# Patient Record
Sex: FEMALE | NOT HISPANIC OR LATINO | URBAN - METROPOLITAN AREA
[De-identification: names, ages, dates, MRNs, and addresses within clinical notes are randomized per-mention and may not be internally consistent; named-entity substitution may affect disease eponyms.]

---

## 2020-08-27 ENCOUNTER — TELEPHONE ENCOUNTER (OUTPATIENT)
Dept: URBAN - METROPOLITAN AREA CLINIC 35 | Facility: CLINIC | Age: 60
End: 2020-08-27

## 2020-09-03 ENCOUNTER — LAB OUTSIDE AN ENCOUNTER (OUTPATIENT)
Dept: URBAN - METROPOLITAN AREA CLINIC 37 | Facility: CLINIC | Age: 60
End: 2020-09-03

## 2020-09-03 ENCOUNTER — OFFICE VISIT (OUTPATIENT)
Dept: URBAN - METROPOLITAN AREA CLINIC 37 | Facility: CLINIC | Age: 60
End: 2020-09-03

## 2020-09-03 VITALS — HEIGHT: 66 IN | BODY MASS INDEX: 20.25 KG/M2 | WEIGHT: 126 LBS

## 2020-09-03 PROBLEM — 442076002: Status: ACTIVE | Noted: 2020-09-03

## 2020-09-03 RX ORDER — LEVOTHYROXINE SODIUM 112 UG/1
1 TABLET ON AN EMPTY STOMACH IN THE MORNING TABLET ORAL ONCE A DAY
Status: ACTIVE | COMMUNITY

## 2020-09-03 RX ORDER — PERPHENAZINE 2 MG
TABLET ORAL PRN
Status: ON HOLD | COMMUNITY

## 2020-09-03 RX ORDER — OMEPRAZOLE 20 MG/1
1 CAPSULE CAPSULE, DELAYED RELEASE ORAL
Qty: 30 | Refills: 2 | Status: ON HOLD | COMMUNITY
Start: 2016-11-18

## 2020-09-03 RX ORDER — AMLODIPINE BESYLATE 5 MG/1
1 TABLET TABLET ORAL ONCE A DAY
Status: ON HOLD | COMMUNITY

## 2020-09-03 RX ORDER — MIRTAZAPINE 15 MG/1
1 TABLET AT BEDTIME TABLET, FILM COATED ORAL ONCE A DAY
Qty: 30 | Status: ACTIVE | COMMUNITY

## 2020-09-03 RX ORDER — MECLIZINE HCL 25MG 25 MG/1
1 TABLET AS NEEDED TABLET, CHEWABLE ORAL
Status: ON HOLD | COMMUNITY

## 2020-09-03 RX ORDER — MIRABEGRON 50 MG/1
1 TABLET TABLET, FILM COATED, EXTENDED RELEASE ORAL
Status: ACTIVE | COMMUNITY

## 2020-09-03 RX ORDER — LINACLOTIDE 145 UG/1
1 CAPSULE CAPSULE, GELATIN COATED ORAL ONCE A DAY
Qty: 90 CAPSULE | Refills: 2 | Status: ON HOLD | COMMUNITY
Start: 2016-11-18

## 2020-09-03 RX ORDER — ZOLPIDEM TARTRATE 10 MG/1
1 TABLET AT BEDTIME AS NEEDED TABLET, FILM COATED ORAL
Status: ON HOLD | COMMUNITY

## 2020-09-03 RX ORDER — PRAVASTATIN SODIUM 20 MG/1
1 TABLET TABLET ORAL
Status: ACTIVE | COMMUNITY

## 2020-09-03 RX ORDER — LINACLOTIDE 72 UG/1
1 CAPSULE AT LEAST 30 MINUTES BEFORE THE FIRST MEAL OF THE DAY ON AN EMPTY STOMACH CAPSULE, GELATIN COATED ORAL QPM
Qty: 90 | Refills: 4 | OUTPATIENT
Start: 2020-09-03

## 2020-09-03 RX ORDER — OXYBUTYNIN CHLORIDE 10 MG/1
1 TABLET TABLET, EXTENDED RELEASE ORAL ONCE A DAY
Status: ON HOLD | COMMUNITY

## 2020-09-03 RX ORDER — SODIUM SULFATE, POTASSIUM SULFATE, MAGNESIUM SULFATE 17.5; 3.13; 1.6 G/ML; G/ML; G/ML
AS DIRECTED SOLUTION, CONCENTRATE ORAL ONCE
Qty: 1 KIT | Refills: 0 | OUTPATIENT
Start: 2020-09-03

## 2020-09-03 RX ORDER — LOSARTAN POTASSIUM 50 MG/1
1 TABLET TABLET ORAL ONCE A DAY
Status: ACTIVE | COMMUNITY

## 2020-09-03 RX ORDER — VALSARTAN 40 MG/1
1 TABLET TABLET, FILM COATED ORAL TWICE A DAY
Qty: 60 | Status: ON HOLD | COMMUNITY

## 2020-09-03 RX ORDER — MEGESTROL ACETATE 40 MG/ML
1 ML SUSPENSION ORAL TWICE A DAY
Qty: 60 | Status: ACTIVE | COMMUNITY

## 2020-09-03 NOTE — HPI-MIGRATED HPI
Initial consultation : Patient is here for -> Early satiety & weight loss Onset of symptoms: several months Characteristics: Feeling full after meals, admits eating one meal a day due to feeling full after a few bites of food  Aggravating factors: None Associated symptoms: unintentional weight loss, 14 lbs since June. Occasional nausea when she force herself to eat Has chronic hoarseness d/t laryngeal reflux  Relieving factors: None Medications tried: None. Previously was on Nexium but she stopped Recently started on Megace and Mirtazapine by Dr. Mayfield  Patient also complains of constipation  Patient admits she has tried Linzess 145 mcg but it caused loose stools.  Therefore, patient is taking Miralax PRN Current BM: 1 BM once a week    **Tests/evaluations done previously:  - Last Colonoscopy done 12/22/2016, with me at [facility]. at that time five polyp and a larger polyp (measuring 10 mm) were detected and resected. Histology showed they were hyperplastic polyps (4) and tubular adenomas (2). Good quality bowel prep. Advised to repeat in 5 years  - EGD on the same day: the esophagus appeared normal with bxx of the lower third of the esophagus showing basilar hyperplasia and elongation of the vascular papillae suggestive of reflux esophagitis, negative for dysplasia. The GE junction was irregular. One 6mm gastric polyp was detected and resected from the gastric antrum bxx showed it contained regenerative changes with polypoid foveolar hyperplasia. Gastritis was found in the gastric body and antrum with bxx showing mild chronic gastritis consistent with reactive gastropathy. No H. pylori or IM identified. Normal duodenum;

## 2020-09-03 NOTE — EXAM-MIGRATED EXAMINATIONS
GENERAL APPEARANCE: - alert, in no acute distress, well developed, well nourished;   HEAD: - atraumatic, normocephalic;   EYES: - sclera anicteric bilaterally;   NECK/THYROID: - neck supple, full range of motion, no cervical lymphadenopathy, no thyroid nodules, no thyromegaly, trachea midline;   NEUROLOGIC: - cranial nerves 2-12 grossly intact;   PSYCH: - cooperative with exam, mood/affect full range;

## 2020-09-16 ENCOUNTER — TELEPHONE ENCOUNTER (OUTPATIENT)
Dept: URBAN - METROPOLITAN AREA CLINIC 35 | Facility: CLINIC | Age: 60
End: 2020-09-16

## 2020-09-24 ENCOUNTER — OFFICE VISIT (OUTPATIENT)
Dept: URBAN - METROPOLITAN AREA CLINIC 35 | Facility: CLINIC | Age: 60
End: 2020-09-24

## 2020-09-30 ENCOUNTER — OFFICE VISIT (OUTPATIENT)
Dept: URBAN - METROPOLITAN AREA SURGERY CENTER 8 | Facility: SURGERY CENTER | Age: 60
End: 2020-09-30

## 2020-10-15 ENCOUNTER — OFFICE VISIT (OUTPATIENT)
Dept: URBAN - METROPOLITAN AREA CLINIC 37 | Facility: CLINIC | Age: 60
End: 2020-10-15

## 2020-10-21 ENCOUNTER — TELEPHONE ENCOUNTER (OUTPATIENT)
Dept: URBAN - METROPOLITAN AREA CLINIC 35 | Facility: CLINIC | Age: 60
End: 2020-10-21

## 2020-10-26 ENCOUNTER — OFFICE VISIT (OUTPATIENT)
Dept: URBAN - METROPOLITAN AREA MEDICAL CENTER 10 | Facility: MEDICAL CENTER | Age: 60
End: 2020-10-26

## 2020-12-07 ENCOUNTER — TELEPHONE ENCOUNTER (OUTPATIENT)
Dept: URBAN - METROPOLITAN AREA CLINIC 35 | Facility: CLINIC | Age: 60
End: 2020-12-07

## 2020-12-07 ENCOUNTER — OFFICE VISIT (OUTPATIENT)
Dept: URBAN - METROPOLITAN AREA MEDICAL CENTER 10 | Facility: MEDICAL CENTER | Age: 60
End: 2020-12-07

## 2020-12-07 RX ORDER — ZOLPIDEM TARTRATE 10 MG/1
1 TABLET AT BEDTIME AS NEEDED TABLET, FILM COATED ORAL
Status: ON HOLD | COMMUNITY

## 2020-12-07 RX ORDER — LINACLOTIDE 145 UG/1
1 CAPSULE CAPSULE, GELATIN COATED ORAL ONCE A DAY
Qty: 90 CAPSULE | Refills: 2 | Status: ON HOLD | COMMUNITY
Start: 2016-11-18

## 2020-12-07 RX ORDER — LINACLOTIDE 72 UG/1
1 CAPSULE AT LEAST 30 MINUTES BEFORE THE FIRST MEAL OF THE DAY ON AN EMPTY STOMACH CAPSULE, GELATIN COATED ORAL QPM
Qty: 90 | Refills: 4 | Status: ACTIVE | COMMUNITY
Start: 2020-09-03

## 2020-12-07 RX ORDER — VALSARTAN 40 MG/1
1 TABLET TABLET, FILM COATED ORAL TWICE A DAY
Qty: 60 | Status: ON HOLD | COMMUNITY

## 2020-12-07 RX ORDER — MIRABEGRON 50 MG/1
1 TABLET TABLET, FILM COATED, EXTENDED RELEASE ORAL
Status: ACTIVE | COMMUNITY

## 2020-12-07 RX ORDER — OMEPRAZOLE 20 MG/1
1 CAPSULE CAPSULE, DELAYED RELEASE ORAL
Qty: 30 | Refills: 2 | Status: ON HOLD | COMMUNITY
Start: 2016-11-18

## 2020-12-07 RX ORDER — MEGESTROL ACETATE 40 MG/ML
1 ML SUSPENSION ORAL TWICE A DAY
Qty: 60 | Status: ACTIVE | COMMUNITY

## 2020-12-07 RX ORDER — SODIUM SULFATE, POTASSIUM SULFATE, MAGNESIUM SULFATE 17.5; 3.13; 1.6 G/ML; G/ML; G/ML
AS DIRECTED SOLUTION, CONCENTRATE ORAL ONCE
Qty: 1 KIT | Refills: 0 | Status: ACTIVE | COMMUNITY
Start: 2020-09-03

## 2020-12-07 RX ORDER — LOSARTAN POTASSIUM 50 MG/1
1 TABLET TABLET ORAL ONCE A DAY
Status: ACTIVE | COMMUNITY

## 2020-12-07 RX ORDER — MECLIZINE HCL 25MG 25 MG/1
1 TABLET AS NEEDED TABLET, CHEWABLE ORAL
Status: ON HOLD | COMMUNITY

## 2020-12-07 RX ORDER — AMLODIPINE BESYLATE 5 MG/1
1 TABLET TABLET ORAL ONCE A DAY
Status: ON HOLD | COMMUNITY

## 2020-12-07 RX ORDER — LEVOTHYROXINE SODIUM 112 UG/1
1 TABLET ON AN EMPTY STOMACH IN THE MORNING TABLET ORAL ONCE A DAY
Status: ACTIVE | COMMUNITY

## 2020-12-07 RX ORDER — PERPHENAZINE 2 MG
TABLET ORAL PRN
Status: ON HOLD | COMMUNITY

## 2020-12-07 RX ORDER — OXYBUTYNIN CHLORIDE 10 MG/1
1 TABLET TABLET, EXTENDED RELEASE ORAL ONCE A DAY
Status: ON HOLD | COMMUNITY

## 2020-12-07 RX ORDER — MIRTAZAPINE 15 MG/1
1 TABLET AT BEDTIME TABLET, FILM COATED ORAL ONCE A DAY
Qty: 30 | Status: ACTIVE | COMMUNITY

## 2020-12-07 RX ORDER — PRAVASTATIN SODIUM 20 MG/1
1 TABLET TABLET ORAL
Status: ACTIVE | COMMUNITY

## 2020-12-08 ENCOUNTER — TELEPHONE ENCOUNTER (OUTPATIENT)
Dept: URBAN - METROPOLITAN AREA CLINIC 35 | Facility: CLINIC | Age: 60
End: 2020-12-08

## 2020-12-22 ENCOUNTER — OFFICE VISIT (OUTPATIENT)
Dept: URBAN - METROPOLITAN AREA CLINIC 35 | Facility: CLINIC | Age: 60
End: 2020-12-22

## 2020-12-22 VITALS — HEIGHT: 66 IN | BODY MASS INDEX: 20.25 KG/M2 | WEIGHT: 126 LBS

## 2020-12-22 RX ORDER — LOSARTAN POTASSIUM 50 MG/1
1 TABLET TABLET ORAL ONCE A DAY
Status: ACTIVE | COMMUNITY

## 2020-12-22 RX ORDER — OXYBUTYNIN CHLORIDE 10 MG/1
1 TABLET TABLET, EXTENDED RELEASE ORAL ONCE A DAY
Status: ON HOLD | COMMUNITY

## 2020-12-22 RX ORDER — PERPHENAZINE 2 MG
TABLET ORAL PRN
Status: ON HOLD | COMMUNITY

## 2020-12-22 RX ORDER — MULTIVITAMIN
1 TABLET TABLET ORAL ONCE A DAY
Status: ACTIVE | COMMUNITY

## 2020-12-22 RX ORDER — LINACLOTIDE 145 UG/1
1 CAPSULE CAPSULE, GELATIN COATED ORAL ONCE A DAY
Qty: 90 CAPSULE | Refills: 2 | Status: ON HOLD | COMMUNITY
Start: 2016-11-18

## 2020-12-22 RX ORDER — OMEPRAZOLE 20 MG/1
1 CAPSULE CAPSULE, DELAYED RELEASE ORAL
Qty: 30 | Refills: 2 | Status: ON HOLD | COMMUNITY
Start: 2016-11-18

## 2020-12-22 RX ORDER — MEGESTROL ACETATE 40 MG/ML
1 ML SUSPENSION ORAL TWICE A DAY
Qty: 60 | Status: ACTIVE | COMMUNITY

## 2020-12-22 RX ORDER — AMLODIPINE BESYLATE 5 MG/1
1 TABLET TABLET ORAL ONCE A DAY
Status: ON HOLD | COMMUNITY

## 2020-12-22 RX ORDER — PRAVASTATIN SODIUM 20 MG/1
1 TABLET TABLET ORAL
Status: ACTIVE | COMMUNITY

## 2020-12-22 RX ORDER — LEVOTHYROXINE SODIUM 112 UG/1
1 TABLET ON AN EMPTY STOMACH IN THE MORNING TABLET ORAL ONCE A DAY
Status: ACTIVE | COMMUNITY

## 2020-12-22 RX ORDER — ZOLPIDEM TARTRATE 10 MG/1
1 TABLET AT BEDTIME AS NEEDED TABLET, FILM COATED ORAL
Status: ON HOLD | COMMUNITY

## 2020-12-22 RX ORDER — MECLIZINE HCL 25MG 25 MG/1
1 TABLET AS NEEDED TABLET, CHEWABLE ORAL
Status: ON HOLD | COMMUNITY

## 2020-12-22 RX ORDER — LINACLOTIDE 72 UG/1
1 CAPSULE AT LEAST 30 MINUTES BEFORE THE FIRST MEAL OF THE DAY ON AN EMPTY STOMACH CAPSULE, GELATIN COATED ORAL QPM
Qty: 90 | Refills: 4 | Status: ACTIVE | COMMUNITY
Start: 2020-09-03

## 2020-12-22 RX ORDER — VALSARTAN 40 MG/1
1 TABLET TABLET, FILM COATED ORAL TWICE A DAY
Qty: 60 | Status: ON HOLD | COMMUNITY

## 2020-12-22 RX ORDER — MIRABEGRON 50 MG/1
1 TABLET TABLET, FILM COATED, EXTENDED RELEASE ORAL
Status: ACTIVE | COMMUNITY

## 2020-12-22 RX ORDER — OMEPRAZOLE 20 MG/1
1 CAPSULE 30 MINUTES BEFORE MORNING MEAL CAPSULE, DELAYED RELEASE ORAL ONCE A DAY
Qty: 30 | Refills: 2 | OUTPATIENT
Start: 2020-12-22

## 2020-12-22 RX ORDER — MIRTAZAPINE 15 MG/1
1 TABLET AT BEDTIME TABLET, FILM COATED ORAL ONCE A DAY
Qty: 30 | Status: ON HOLD | COMMUNITY

## 2020-12-22 RX ORDER — LINACLOTIDE 72 UG/1
1 CAPSULE AT LEAST 30 MINUTES BEFORE THE FIRST MEAL OF THE DAY ON AN EMPTY STOMACH CAPSULE, GELATIN COATED ORAL QPM
Qty: 90 | Refills: 4

## 2020-12-22 NOTE — HPI-MIGRATED HPI
Follow up OV : Last OV was -> 3 months ago Patient was previously on Linzess 145 mcg but discontinued due to loose stool ;   Follow up OV : Patient is on -> Linzess 72 mcg PRN only, not daily as prescriber Current BM: 2 hard BM a week, occasional rectal bleeding upon wiping;   Follow up OV : Patient is here for a routine OV for -> Constipation;   Post-op OV : Patient -> denies any new changes in his/her health status since last OV;   Post-op OV : Patient reports of current symptoms -> Patient denies any more weight loss, has actually gained 5 lbs since last OV.  Continues with early satiety  **** Patient was originally scheduled for a double procedure due to weight loss. However, patient was hesitant to have colonoscopy procedure. Therefore only EGD was preformed;   Post-op OV : After EGD on -> 12/07/2020, done due to early satiety and weight loss. The esophagus was normal with distal esophageal bxx showing increased intraepithelial chronic inflammation. The GE junction was irregular. Gastritis was found with bxx confirming mild chronic gastritis in the gastric body and antrum. No evidence of H. pylori or IM. Normal duodenum with bxx confirming no significant histopathologic change;   Post-op OV : Patient denies -> rectal bleeding, fever, nausea & vomiting since the procedure date;

## 2021-02-23 ENCOUNTER — OFFICE VISIT (OUTPATIENT)
Dept: URBAN - METROPOLITAN AREA CLINIC 35 | Facility: CLINIC | Age: 61
End: 2021-02-23

## 2021-02-23 VITALS — WEIGHT: 121 LBS | HEIGHT: 66 IN | BODY MASS INDEX: 19.44 KG/M2

## 2021-02-23 RX ORDER — LINACLOTIDE 72 UG/1
1 CAPSULE AT LEAST 30 MINUTES BEFORE THE FIRST MEAL OF THE DAY ON AN EMPTY STOMACH CAPSULE, GELATIN COATED ORAL QPM
Qty: 90 | Refills: 4 | Status: ACTIVE | COMMUNITY

## 2021-02-23 RX ORDER — LINACLOTIDE 145 UG/1
1 CAPSULE AT LEAST 30 MINUTES BEFORE THE FIRST MEAL OF THE DAY ON AN EMPTY STOMACH CAPSULE, GELATIN COATED ORAL QPM
Qty: 90 | Refills: 4

## 2021-02-23 RX ORDER — VALSARTAN 160 MG/1
1 TABLET TABLET, FILM COATED ORAL ONCE A DAY
Status: ACTIVE | COMMUNITY

## 2021-02-23 RX ORDER — AMLODIPINE BESYLATE 5 MG/1
1 TABLET TABLET ORAL ONCE A DAY
Status: ON HOLD | COMMUNITY

## 2021-02-23 RX ORDER — MULTIVITAMIN
1 TABLET TABLET ORAL ONCE A DAY
Status: ACTIVE | COMMUNITY

## 2021-02-23 RX ORDER — ZOLPIDEM TARTRATE 10 MG/1
1 TABLET AT BEDTIME AS NEEDED TABLET, FILM COATED ORAL
Status: ON HOLD | COMMUNITY

## 2021-02-23 RX ORDER — PERPHENAZINE 2 MG
TABLET ORAL PRN
Status: ON HOLD | COMMUNITY

## 2021-02-23 RX ORDER — OXYBUTYNIN CHLORIDE 10 MG/1
1 TABLET TABLET, EXTENDED RELEASE ORAL ONCE A DAY
Status: ON HOLD | COMMUNITY

## 2021-02-23 RX ORDER — LEVOTHYROXINE SODIUM 112 UG/1
1 TABLET ON AN EMPTY STOMACH IN THE MORNING TABLET ORAL ONCE A DAY
Status: ACTIVE | COMMUNITY

## 2021-02-23 RX ORDER — AMLODIPINE BESYLATE 5 MG/1
1 TABLET TABLET ORAL ONCE A DAY
Status: ACTIVE | COMMUNITY

## 2021-02-23 RX ORDER — MEGESTROL ACETATE 40 MG/ML
1 ML SUSPENSION ORAL TWICE A DAY
Qty: 60 | Status: ON HOLD | COMMUNITY

## 2021-02-23 RX ORDER — PRAVASTATIN SODIUM 20 MG/1
1 TABLET TABLET ORAL
Status: ACTIVE | COMMUNITY

## 2021-02-23 RX ORDER — MIRTAZAPINE 15 MG/1
1 TABLET AT BEDTIME TABLET, FILM COATED ORAL ONCE A DAY
Qty: 30 | Status: ON HOLD | COMMUNITY

## 2021-02-23 RX ORDER — OMEPRAZOLE 20 MG/1
1 CAPSULE 30 MINUTES BEFORE MORNING MEAL CAPSULE, DELAYED RELEASE ORAL ONCE A DAY
Qty: 30 | Refills: 2 | Status: ACTIVE | COMMUNITY
Start: 2020-12-22

## 2021-02-23 RX ORDER — CHOLECALCIFEROL (VITAMIN D3) 125 MCG
1 TABLET CAPSULE ORAL
Status: ACTIVE | COMMUNITY

## 2021-02-23 RX ORDER — OMEPRAZOLE 20 MG/1
1 CAPSULE 30 MINUTES BEFORE MORNING MEAL CAPSULE, DELAYED RELEASE ORAL ONCE A DAY
Qty: 30 | Refills: 3

## 2021-02-23 RX ORDER — MIRABEGRON 50 MG/1
1 TABLET TABLET, FILM COATED, EXTENDED RELEASE ORAL
Status: ACTIVE | COMMUNITY

## 2021-02-23 RX ORDER — MECLIZINE HCL 25MG 25 MG/1
1 TABLET AS NEEDED TABLET, CHEWABLE ORAL
Status: ON HOLD | COMMUNITY

## 2021-02-23 NOTE — HPI-MIGRATED HPI
Follow up OV : Patient is here for a routine OV for -> Gastritis and constipation;   Follow up OV : Last OV was -> 2 months ago  Patient was originally scheduled for a double procedure due to weight loss. However, patient was hesitant to have colonoscopy procedure. Therefore only EGD was preformed Patient denies any significant weight loss since then  Gastritis:  EGD on 12/07/2020 revealed mild chronic gastritis, negative for IM and H. pylori Patient is on Omeprazole 20mg PRN only  Current Sx: denies UGI symptoms  Constipation:  Patient continues taking Linzess 72 mcg QPM since last OV Patient was previously on Linzess 145 mcg but discontinued due to loose stool.  Ooccasional rectal bleeding on TP Current BM: 2-3 BM a week, admits occasional rectal bleeding upon wiping when she has hard stools ;

## 2021-04-27 ENCOUNTER — OFFICE VISIT (OUTPATIENT)
Dept: URBAN - METROPOLITAN AREA CLINIC 35 | Facility: CLINIC | Age: 61
End: 2021-04-27

## 2021-04-27 VITALS — HEIGHT: 66 IN | WEIGHT: 125 LBS | BODY MASS INDEX: 20.09 KG/M2

## 2021-04-27 RX ORDER — PRAVASTATIN SODIUM 20 MG/1
1 TABLET TABLET ORAL
Status: ACTIVE | COMMUNITY

## 2021-04-27 RX ORDER — OXYBUTYNIN CHLORIDE 10 MG/1
1 TABLET TABLET, EXTENDED RELEASE ORAL ONCE A DAY
Status: ON HOLD | COMMUNITY

## 2021-04-27 RX ORDER — LEVOTHYROXINE SODIUM 112 UG/1
1 TABLET ON AN EMPTY STOMACH IN THE MORNING TABLET ORAL ONCE A DAY
Status: ACTIVE | COMMUNITY

## 2021-04-27 RX ORDER — OMEPRAZOLE 20 MG/1
1 CAPSULE 30 MINUTES BEFORE MORNING MEAL CAPSULE, DELAYED RELEASE ORAL ONCE A DAY
Qty: 30 | Refills: 3

## 2021-04-27 RX ORDER — LINACLOTIDE 290 UG/1
1 CAPSULE AT LEAST 30 MINUTES BEFORE THE FIRST MEAL OF THE DAY ON AN EMPTY STOMACH CAPSULE, GELATIN COATED ORAL QPM
Qty: 90 | Refills: 4

## 2021-04-27 RX ORDER — AMLODIPINE BESYLATE 5 MG/1
1 TABLET TABLET ORAL ONCE A DAY
Status: ON HOLD | COMMUNITY

## 2021-04-27 RX ORDER — MIRTAZAPINE 15 MG/1
1 TABLET AT BEDTIME TABLET, FILM COATED ORAL ONCE A DAY
Qty: 30 | Status: ON HOLD | COMMUNITY

## 2021-04-27 RX ORDER — MIRABEGRON 50 MG/1
1 TABLET TABLET, FILM COATED, EXTENDED RELEASE ORAL
Status: ACTIVE | COMMUNITY

## 2021-04-27 RX ORDER — MECLIZINE HCL 25MG 25 MG/1
1 TABLET AS NEEDED TABLET, CHEWABLE ORAL
Status: ON HOLD | COMMUNITY

## 2021-04-27 RX ORDER — OMEPRAZOLE 20 MG/1
1 CAPSULE 30 MINUTES BEFORE MORNING MEAL CAPSULE, DELAYED RELEASE ORAL ONCE A DAY
Qty: 30 | Refills: 3 | Status: ACTIVE | COMMUNITY

## 2021-04-27 RX ORDER — VALSARTAN 160 MG/1
1 TABLET TABLET, FILM COATED ORAL ONCE A DAY
Status: ACTIVE | COMMUNITY

## 2021-04-27 RX ORDER — MULTIVITAMIN
1 TABLET TABLET ORAL ONCE A DAY
Status: ACTIVE | COMMUNITY

## 2021-04-27 RX ORDER — AMLODIPINE BESYLATE 5 MG/1
1 TABLET TABLET ORAL ONCE A DAY
Status: ACTIVE | COMMUNITY

## 2021-04-27 RX ORDER — CHOLECALCIFEROL (VITAMIN D3) 125 MCG
1 TABLET CAPSULE ORAL
Status: ACTIVE | COMMUNITY

## 2021-04-27 RX ORDER — PERPHENAZINE 2 MG
TABLET ORAL PRN
Status: ON HOLD | COMMUNITY

## 2021-04-27 RX ORDER — MEGESTROL ACETATE 40 MG/ML
1 ML SUSPENSION ORAL TWICE A DAY
Qty: 60 | Status: ON HOLD | COMMUNITY

## 2021-04-27 RX ORDER — ZOLPIDEM TARTRATE 10 MG/1
1 TABLET AT BEDTIME AS NEEDED TABLET, FILM COATED ORAL
Status: ON HOLD | COMMUNITY

## 2021-04-27 RX ORDER — LINACLOTIDE 145 UG/1
1 CAPSULE AT LEAST 30 MINUTES BEFORE THE FIRST MEAL OF THE DAY ON AN EMPTY STOMACH CAPSULE, GELATIN COATED ORAL QPM
Qty: 90 | Refills: 4 | Status: ACTIVE | COMMUNITY

## 2021-04-27 NOTE — HPI-MIGRATED HPI
Follow up OV : Last OV was -> 2 months ago  Patient was originally scheduled for a double procedure in 10/2020 due to weight loss. However, patient was hesitant to have colonoscopy procedure. Therefore only EGD was performed Patient denies any significant weight loss since then Patient no longer taking Megace and Mirtazapine per Dr. Mayfield  Gastritis:  EGD on 12/07/2020 revealed mild chronic gastritis, negative for IM and H. pylori Patient is on Omeprazole 20mg PRN only  Current Sx: Denies UGI symptoms  Constipation:  Patient has been on Linzess 145 mcg QPM + OTC Senna or Ducolax PRN  Current BM: 1 BM daily ;   Follow up OV : Patient is here for a routine OV for -> Gastritis and constipation;

## 2021-08-05 ENCOUNTER — TELEPHONE ENCOUNTER (OUTPATIENT)
Dept: URBAN - METROPOLITAN AREA CLINIC 35 | Facility: CLINIC | Age: 61
End: 2021-08-05

## 2021-09-14 ENCOUNTER — OFFICE VISIT (OUTPATIENT)
Dept: URBAN - METROPOLITAN AREA CLINIC 35 | Facility: CLINIC | Age: 61
End: 2021-09-14

## 2021-09-14 ENCOUNTER — LAB OUTSIDE AN ENCOUNTER (OUTPATIENT)
Dept: URBAN - METROPOLITAN AREA CLINIC 35 | Facility: CLINIC | Age: 61
End: 2021-09-14

## 2021-09-14 VITALS — HEIGHT: 66 IN

## 2021-09-14 PROBLEM — 89362005: Status: ACTIVE | Noted: 2020-09-03

## 2021-09-14 RX ORDER — PRAVASTATIN SODIUM 20 MG/1
1 TABLET TABLET ORAL
Status: ACTIVE | COMMUNITY

## 2021-09-14 RX ORDER — AMLODIPINE BESYLATE 5 MG/1
1 TABLET TABLET ORAL ONCE A DAY
Status: ACTIVE | COMMUNITY

## 2021-09-14 RX ORDER — OXYBUTYNIN CHLORIDE 10 MG/1
1 TABLET TABLET, EXTENDED RELEASE ORAL ONCE A DAY
Status: ON HOLD | COMMUNITY

## 2021-09-14 RX ORDER — AMLODIPINE BESYLATE 5 MG/1
1 TABLET TABLET ORAL ONCE A DAY
Status: ON HOLD | COMMUNITY

## 2021-09-14 RX ORDER — LINACLOTIDE 290 UG/1
1 CAPSULE AT LEAST 30 MINUTES BEFORE THE FIRST MEAL OF THE DAY ON AN EMPTY STOMACH CAPSULE, GELATIN COATED ORAL QPM
Qty: 90 | Refills: 4 | Status: ACTIVE | COMMUNITY

## 2021-09-14 RX ORDER — LEVOTHYROXINE SODIUM 112 UG/1
1 TABLET ON AN EMPTY STOMACH IN THE MORNING TABLET ORAL ONCE A DAY
Status: ACTIVE | COMMUNITY

## 2021-09-14 RX ORDER — MEGESTROL ACETATE 40 MG/ML
1 ML SUSPENSION ORAL TWICE A DAY
Qty: 60 | Status: ON HOLD | COMMUNITY

## 2021-09-14 RX ORDER — OMEPRAZOLE 20 MG/1
1 CAPSULE 30 MINUTES BEFORE MORNING MEAL CAPSULE, DELAYED RELEASE ORAL ONCE A DAY
Qty: 30 | Refills: 3

## 2021-09-14 RX ORDER — OMEPRAZOLE 20 MG/1
1 CAPSULE 30 MINUTES BEFORE MORNING MEAL CAPSULE, DELAYED RELEASE ORAL ONCE A DAY
Qty: 30 | Refills: 3 | Status: ACTIVE | COMMUNITY

## 2021-09-14 RX ORDER — ZOLPIDEM TARTRATE 10 MG/1
1 TABLET AT BEDTIME AS NEEDED TABLET, FILM COATED ORAL
Status: ON HOLD | COMMUNITY

## 2021-09-14 RX ORDER — PERPHENAZINE 2 MG
TABLET ORAL PRN
Status: ON HOLD | COMMUNITY

## 2021-09-14 RX ORDER — MULTIVITAMIN
1 TABLET TABLET ORAL ONCE A DAY
Status: ACTIVE | COMMUNITY

## 2021-09-14 RX ORDER — VALSARTAN 160 MG/1
1 TABLET TABLET, FILM COATED ORAL ONCE A DAY
Status: ACTIVE | COMMUNITY

## 2021-09-14 RX ORDER — LINACLOTIDE 290 UG/1
1 CAPSULE AT LEAST 30 MINUTES BEFORE THE FIRST MEAL OF THE DAY ON AN EMPTY STOMACH CAPSULE, GELATIN COATED ORAL QPM
Qty: 90 | Refills: 4

## 2021-09-14 RX ORDER — SODIUM, POTASSIUM,MAG SULFATES 17.5-3.13G
177ML SOLUTION, RECONSTITUTED, ORAL ORAL ONCE
Qty: 1 KIT | Refills: 0 | OUTPATIENT
Start: 2021-09-14

## 2021-09-14 RX ORDER — MIRABEGRON 50 MG/1
1 TABLET TABLET, FILM COATED, EXTENDED RELEASE ORAL
Status: ACTIVE | COMMUNITY

## 2021-09-14 RX ORDER — MECLIZINE HCL 25MG 25 MG/1
1 TABLET AS NEEDED TABLET, CHEWABLE ORAL
Status: ON HOLD | COMMUNITY

## 2021-09-14 RX ORDER — CHOLECALCIFEROL (VITAMIN D3) 125 MCG
1 TABLET CAPSULE ORAL
Status: ACTIVE | COMMUNITY

## 2021-09-14 RX ORDER — MIRTAZAPINE 15 MG/1
1 TABLET AT BEDTIME TABLET, FILM COATED ORAL ONCE A DAY
Qty: 30 | Status: ON HOLD | COMMUNITY

## 2021-09-14 NOTE — HPI-MIGRATED HPI
Follow up OV : Patient is here for a routine OV for -> Weight loss and Gastritis;   Follow up OV : Current symptoms are -> none;   Follow up OV : Last OV was -> 4 months ago Patient was seen 09/2020 due to complaints of unintentional weight loss.  Subsequently had an EGD done for etiology on 12/2020, The esophagus was normal with distal esophageal bxx showing increased intraepithelial chronic inflammation. The GE junction was irregular. Gastritis was found with bxx confirming mild chronic gastritis in the gastric body and antrum. No evidence of H. pylori or IM. Normal duodenum with bxx confirming no significant histopathologic change.  Patient was originally scheduled for a double procedure due to weight loss. However, patient was hesitant to have colonoscopy procedure. Therefore only EGD was preformed.  Patient also continues taking Megace and Mirtazapine per Dr. Mayfield . Patient also taking Omeprazole 40 mg PRN only for gastritis;   Colorectal cancer screening : Family history of GI malignancy: -> Denies;   Colorectal cancer screening : Patient is here for -> high risk surveillance colonoscopy due to personal history of colonic polyps;   Colorectal cancer screening : Last colonoscopy was -> 1 year ago but with poor bowel prep;   Colorectal cancer screening : Patients denies -> rectal bleeding, change in bowel habits, diarrhea, or abdominal pain; + constipation;   Colorectal cancer screening : Patient admits -> history of chronic constipation.  Patient continues taking Linzess 290 mcg QPM and MiraLax PRN QAM;   Colorectal cancer screening : Current bowel movement patterns -> QOD-Q3days;     Colorectal cancer screening : Denies dysphagia or odynophagia Currently taking anticoagulants/NSAIDs: None;

## 2021-09-14 NOTE — EXAM-MIGRATED EXAMINATIONS
GENERAL APPEARANCE: - alert, in no acute distress, well developed, well nourished;   HEAD: - normocephalic, atraumatic;   EYES: - sclera anicteric bilaterally;   NECK/THYROID: - neck supple, full range of motion, no cervical lymphadenopathy, no jugular venous distention, no thyroid nodules, no thyromegaly, trachea midline;   NEUROLOGIC: - cranial nerves 2-12 grossly intact;   PSYCH: - good eye contact, mood/affect full range;

## 2021-12-13 ENCOUNTER — OFFICE VISIT (OUTPATIENT)
Dept: URBAN - METROPOLITAN AREA MEDICAL CENTER 10 | Facility: MEDICAL CENTER | Age: 61
End: 2021-12-13

## 2022-01-04 ENCOUNTER — OFFICE VISIT (OUTPATIENT)
Dept: URBAN - METROPOLITAN AREA CLINIC 35 | Facility: CLINIC | Age: 62
End: 2022-01-04

## 2022-01-12 ENCOUNTER — TELEPHONE ENCOUNTER (OUTPATIENT)
Dept: URBAN - METROPOLITAN AREA CLINIC 36 | Facility: CLINIC | Age: 62
End: 2022-01-12

## 2022-01-31 ENCOUNTER — OFFICE VISIT (OUTPATIENT)
Dept: URBAN - METROPOLITAN AREA MEDICAL CENTER 10 | Facility: MEDICAL CENTER | Age: 62
End: 2022-01-31
Payer: COMMERCIAL

## 2022-01-31 DIAGNOSIS — Z86.010 ADENOMAS PERSONAL HISTORY OF COLONIC POLYPS: ICD-10-CM

## 2022-01-31 DIAGNOSIS — D12.2 ADENOMA OF ASCENDING COLON: ICD-10-CM

## 2022-01-31 DIAGNOSIS — K62.89 ACUTE PROCTITIS: ICD-10-CM

## 2022-01-31 PROCEDURE — 45380 COLONOSCOPY AND BIOPSY: CPT | Performed by: INTERNAL MEDICINE

## 2022-02-04 ENCOUNTER — DASHBOARD ENCOUNTERS (OUTPATIENT)
Age: 62
End: 2022-02-04

## 2022-02-16 PROBLEM — 4556007: Status: ACTIVE | Noted: 2020-12-21

## 2022-02-16 PROBLEM — 428283002: Status: ACTIVE | Noted: 2020-09-03

## 2022-02-17 ENCOUNTER — OFFICE VISIT (OUTPATIENT)
Dept: URBAN - METROPOLITAN AREA CLINIC 37 | Facility: CLINIC | Age: 62
End: 2022-02-17
Payer: COMMERCIAL

## 2022-02-17 VITALS — WEIGHT: 118 LBS | BODY MASS INDEX: 18.96 KG/M2 | HEIGHT: 66 IN

## 2022-02-17 DIAGNOSIS — R63.4 WEIGHT LOSS: ICD-10-CM

## 2022-02-17 DIAGNOSIS — K63.5 POLYP OF COLON: ICD-10-CM

## 2022-02-17 DIAGNOSIS — K62.1 RECTAL POLYP: ICD-10-CM

## 2022-02-17 DIAGNOSIS — K59.04 CHRONIC IDIOPATHIC CONSTIPATION: ICD-10-CM

## 2022-02-17 DIAGNOSIS — K29.70 GASTRITIS WITHOUT BLEEDING, UNSPECIFIED CHRONICITY, UNSPECIFIED GASTRITIS TYPE: ICD-10-CM

## 2022-02-17 DIAGNOSIS — K64.1 SECOND DEGREE HEMORRHOIDS: ICD-10-CM

## 2022-02-17 DIAGNOSIS — Z86.010 PERSONAL HISTORY OF COLONIC POLYPS: ICD-10-CM

## 2022-02-17 PROCEDURE — 99214 OFFICE O/P EST MOD 30 MIN: CPT | Performed by: INTERNAL MEDICINE

## 2022-02-17 RX ORDER — OXYBUTYNIN CHLORIDE 10 MG/1
1 TABLET TABLET, EXTENDED RELEASE ORAL ONCE A DAY
Status: ON HOLD | COMMUNITY

## 2022-02-17 RX ORDER — LEVOTHYROXINE SODIUM 112 UG/1
1 TABLET ON AN EMPTY STOMACH IN THE MORNING TABLET ORAL ONCE A DAY
Status: ACTIVE | COMMUNITY

## 2022-02-17 RX ORDER — AMLODIPINE BESYLATE 5 MG/1
1 TABLET TABLET ORAL ONCE A DAY
Status: ON HOLD | COMMUNITY

## 2022-02-17 RX ORDER — BISACODYL 5 MG
1 TABLET AS NEEDED TABLET, DELAYED RELEASE (ENTERIC COATED) ORAL ONCE A DAY
Status: ACTIVE | COMMUNITY

## 2022-02-17 RX ORDER — MIRABEGRON 50 MG/1
1 TABLET TABLET, FILM COATED, EXTENDED RELEASE ORAL
Status: ON HOLD | COMMUNITY

## 2022-02-17 RX ORDER — MIRTAZAPINE 15 MG/1
1 TABLET AT BEDTIME TABLET, FILM COATED ORAL ONCE A DAY
Qty: 30 | Status: ON HOLD | COMMUNITY

## 2022-02-17 RX ORDER — MEGESTROL ACETATE 40 MG/ML
1 ML SUSPENSION ORAL TWICE A DAY
Qty: 60 | Status: ON HOLD | COMMUNITY

## 2022-02-17 RX ORDER — PRAVASTATIN SODIUM 20 MG/1
1 TABLET TABLET ORAL
Status: ACTIVE | COMMUNITY

## 2022-02-17 RX ORDER — PERPHENAZINE 2 MG
TABLET ORAL PRN
Status: ON HOLD | COMMUNITY

## 2022-02-17 RX ORDER — CHOLECALCIFEROL (VITAMIN D3) 125 MCG
1 TABLET CAPSULE ORAL
Status: ACTIVE | COMMUNITY

## 2022-02-17 RX ORDER — OMEPRAZOLE 20 MG/1
1 CAPSULE 30 MINUTES BEFORE MORNING MEAL CAPSULE, DELAYED RELEASE ORAL ONCE A DAY
Qty: 30 | Refills: 3 | Status: ACTIVE | COMMUNITY

## 2022-02-17 RX ORDER — ZOLPIDEM TARTRATE 10 MG/1
1 TABLET AT BEDTIME AS NEEDED TABLET, FILM COATED ORAL
Status: ACTIVE | COMMUNITY

## 2022-02-17 RX ORDER — LINACLOTIDE 290 UG/1
1 CAPSULE AT LEAST 30 MINUTES BEFORE THE FIRST MEAL OF THE DAY ON AN EMPTY STOMACH CAPSULE, GELATIN COATED ORAL QPM
Qty: 90 | Refills: 4

## 2022-02-17 RX ORDER — OMEPRAZOLE 20 MG/1
1 CAPSULE 30 MINUTES BEFORE MORNING MEAL CAPSULE, DELAYED RELEASE ORAL ONCE A DAY
Qty: 30 | Refills: 3

## 2022-02-17 RX ORDER — VALSARTAN 160 MG/1
1 TABLET TABLET, FILM COATED ORAL ONCE A DAY
Status: ACTIVE | COMMUNITY

## 2022-02-17 RX ORDER — LINACLOTIDE 290 UG/1
1 CAPSULE AT LEAST 30 MINUTES BEFORE THE FIRST MEAL OF THE DAY ON AN EMPTY STOMACH CAPSULE, GELATIN COATED ORAL QPM
Qty: 90 | Refills: 4 | Status: DISCONTINUED | COMMUNITY

## 2022-02-17 RX ORDER — MULTIVITAMIN
1 TABLET TABLET ORAL ONCE A DAY
Status: ACTIVE | COMMUNITY

## 2022-02-17 RX ORDER — AMLODIPINE BESYLATE 5 MG/1
1 TABLET TABLET ORAL ONCE A DAY
Status: ACTIVE | COMMUNITY

## 2022-02-17 RX ORDER — MECLIZINE HCL 25MG 25 MG/1
1 TABLET AS NEEDED TABLET, CHEWABLE ORAL
Status: ON HOLD | COMMUNITY

## 2022-02-17 NOTE — HPI-CONSTIPATION
Patient is here to follow up for chronic idiopathic constipation. Last OV 5 months ago Patient is currently taking Dulcolax 2 tab PRN, only take medication if she hasn't had a BM for more than 3 days Reports 1 formed BM daily when she take stool softner Patient denies rectal bleeding, blood in stool or melena  Has not yet started on Linzess